# Patient Record
Sex: MALE | Race: OTHER | HISPANIC OR LATINO | URBAN - METROPOLITAN AREA
[De-identification: names, ages, dates, MRNs, and addresses within clinical notes are randomized per-mention and may not be internally consistent; named-entity substitution may affect disease eponyms.]

---

## 2023-01-03 ENCOUNTER — OUTPATIENT (OUTPATIENT)
Dept: OUTPATIENT SERVICES | Facility: HOSPITAL | Age: 48
LOS: 1 days | Discharge: HOME | End: 2023-01-03
Payer: MEDICAID

## 2023-01-03 DIAGNOSIS — M25.569 PAIN IN UNSPECIFIED KNEE: ICD-10-CM

## 2023-01-03 DIAGNOSIS — M25.559 PAIN IN UNSPECIFIED HIP: ICD-10-CM

## 2023-01-03 PROCEDURE — 73522 X-RAY EXAM HIPS BI 3-4 VIEWS: CPT | Mod: 26

## 2023-01-03 PROCEDURE — 73562 X-RAY EXAM OF KNEE 3: CPT | Mod: 26,50

## 2023-06-07 PROBLEM — Z00.00 ENCOUNTER FOR PREVENTIVE HEALTH EXAMINATION: Status: ACTIVE | Noted: 2023-06-07

## 2023-06-08 ENCOUNTER — APPOINTMENT (OUTPATIENT)
Dept: NEUROSURGERY | Facility: CLINIC | Age: 48
End: 2023-06-08
Payer: MEDICAID

## 2023-06-08 VITALS — WEIGHT: 196 LBS | HEIGHT: 66 IN | BODY MASS INDEX: 31.5 KG/M2

## 2023-06-08 DIAGNOSIS — Z86.79 PERSONAL HISTORY OF OTHER DISEASES OF THE CIRCULATORY SYSTEM: ICD-10-CM

## 2023-06-08 DIAGNOSIS — M47.816 SPONDYLOSIS W/OUT MYELOPATHY OR RADICULOPATHY, LUMBAR REGION: ICD-10-CM

## 2023-06-08 DIAGNOSIS — M25.562 PAIN IN RIGHT KNEE: ICD-10-CM

## 2023-06-08 DIAGNOSIS — M25.561 PAIN IN RIGHT KNEE: ICD-10-CM

## 2023-06-08 DIAGNOSIS — F17.200 NICOTINE DEPENDENCE, UNSPECIFIED, UNCOMPLICATED: ICD-10-CM

## 2023-06-08 DIAGNOSIS — Z87.39 PERSONAL HISTORY OF OTHER DISEASES OF THE MUSCULOSKELETAL SYSTEM AND CONNECTIVE TISSUE: ICD-10-CM

## 2023-06-08 DIAGNOSIS — Z82.49 FAMILY HISTORY OF ISCHEMIC HEART DISEASE AND OTHER DISEASES OF THE CIRCULATORY SYSTEM: ICD-10-CM

## 2023-06-08 PROCEDURE — 99202 OFFICE O/P NEW SF 15 MIN: CPT

## 2023-06-08 NOTE — PLAN
[FreeTextEntry1] : Mr. Cleary, is a 47 yrs old male who presents with chronic low back pain and pain in the posterior thighs, and pain in the bilateral knee since this year. We discussed MRI findings, EMG and xray results. No neurological deficit noted. At this time, I am recommending to Mr. Cleary, to continue with conservative management as this appears to be alleviating his pain. I am referring , to orthopedic  for bilateral knee pain, as this can cause his back pain to be exacerbated by how he is ambulating. We briefly discussed the importance of weight loss by maintaining a healthy lifestyle and staying active. \par All questions were answered.\par He may follow up as needed. \par \par \par Case and images discussed with Dr. Villalobos.

## 2023-06-08 NOTE — HISTORY OF PRESENT ILLNESS
[FreeTextEntry1] : low back and knee pain [de-identified] :  Mr. Cleary bhavik 47 yr old male PMH HTN, arthritis presents today with Lower back pain since his work related injury on 1/26/20 when a crate with cement tilted, and he avoided it. He also report of pain behind the posterior thighs near the popiteal region and pain especially on both knees which started this year. Ambulates with a cane. He has been receiving TPI x 3, by Dr. Tobin, last one was on MAy 31, which provides him some relief. He also has been attending PT for the past 3 years, which he reports does help with his pain. Symptoms is aggravated with prolong sitting, standing, lifting, prolong ambulation, and cold weather. It is alleviated when he takes naprosyn which helps with the help for 4 hours and gabapentin 300 qhs. 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